# Patient Record
Sex: MALE | Race: WHITE | NOT HISPANIC OR LATINO | Employment: OTHER | URBAN - METROPOLITAN AREA
[De-identification: names, ages, dates, MRNs, and addresses within clinical notes are randomized per-mention and may not be internally consistent; named-entity substitution may affect disease eponyms.]

---

## 2022-03-31 ENCOUNTER — HOSPITAL ENCOUNTER (EMERGENCY)
Facility: HOSPITAL | Age: 77
Discharge: HOME | End: 2022-03-31
Attending: EMERGENCY MEDICINE
Payer: MEDICARE

## 2022-03-31 VITALS
WEIGHT: 267 LBS | BODY MASS INDEX: 36.16 KG/M2 | DIASTOLIC BLOOD PRESSURE: 91 MMHG | HEIGHT: 72 IN | SYSTOLIC BLOOD PRESSURE: 161 MMHG | HEART RATE: 98 BPM | RESPIRATION RATE: 16 BRPM | TEMPERATURE: 99 F | OXYGEN SATURATION: 99 %

## 2022-03-31 DIAGNOSIS — B02.9 HERPES ZOSTER WITHOUT COMPLICATION: Primary | ICD-10-CM

## 2022-03-31 PROCEDURE — 99284 EMERGENCY DEPT VISIT MOD MDM: CPT | Mod: ,,, | Performed by: EMERGENCY MEDICINE

## 2022-03-31 PROCEDURE — 99284 PR EMERGENCY DEPT VISIT,LEVEL IV: ICD-10-PCS | Mod: ,,, | Performed by: EMERGENCY MEDICINE

## 2022-03-31 PROCEDURE — 99283 EMERGENCY DEPT VISIT LOW MDM: CPT

## 2022-03-31 RX ORDER — VALACYCLOVIR HYDROCHLORIDE 1 G/1
1000 TABLET, FILM COATED ORAL 3 TIMES DAILY
Qty: 21 TABLET | Refills: 0 | Status: SHIPPED | OUTPATIENT
Start: 2022-03-31 | End: 2024-01-15

## 2022-03-31 NOTE — ED TRIAGE NOTES
Judson Torres, a 77 y.o. male presents to the ED w/ complaint of neck and back pain with rash on L side of neck. Pt reports that rash started a few days ago after grandchildren applied Tiger Balm to the area due to muscular pain. Pt denies exudate/bleeding/itching to area. Pt denies n/v/d, constipation, HA, chest pain, SOB, fatigue, fever/chills.     Triage note:  Chief Complaint   Patient presents with    Neck Pain     Put Aspercreme on neck and has rash to left side of neck      Review of patient's allergies indicates:  No Known Allergies  Past Medical History:   Diagnosis Date    Elevated PSA     Kidney stone     Urinary tract infection      LOC: The patient is awake, alert, and oriented to self, place, time, and situation. Pt is calm and cooperative. Affect is appropriate.  Speech is appropriate and clear.     APPEARANCE: Patient resting comfortably in no acute distress.  Patient is clean and well groomed.    SKIN: Pt has erythematous rash present to L side of neck radiating to L shoulder.  Patient has normal skin turgor and moist mucus membranes.  Skin intact; no breakdown or bruising noted.     MUSCULOSKELETAL: Patient moving upper and lower extremities without difficulty; denies pain in the extremities or back.  Denies weakness.     RESPIRATORY: Airway is open and patent. Respirations spontaneous, even, easy, and non-labored.  Patient has a normal effort and rate.  No accessory muscle use noted. Denies cough.     CARDIAC:  Normal rate noted.  No peripheral edema noted. No complaints of chest pain.      ABDOMEN: Soft and non tender to palpation.  No distention noted. Pt denies abdominal pain; denies nausea, vomiting, diarrhea, or constipation.    NEUROLOGIC: Eyes open spontaneously.  Behavior appropriate to situation.  Follows commands; facial expression symmetrical.  Purposeful motor response noted; normal sensation in all extremities. Pt denies headache; denies lightheadedness or dizziness; denies  visual disturbances; denies loss of balance; denies unilateral weakness.         Body Location Override (Optional): L forehead Wound Crusting?: clean Wound Evaluated By (Optional): Dr Cedric Jennings Wound Color?: pink Wound Diameter In Cm(Optional): 0 Detail Level: Detailed Add 96205 Cpt? (Important Note: In 2017 The Use Of 00147 Is Being Tracked By Cms To Determine Future Global Period Reimbursement For Global Periods): yes

## 2022-03-31 NOTE — ED NOTES
Discharge home , states understanding to follow up as directed. Ambulates out of ED without difficulty.All information per physician staff

## 2022-03-31 NOTE — ED PROVIDER NOTES
Encounter Date: 3/31/2022    SCRIBE #1 NOTE: I, Nellienenita felix, am scribing for, and in the presence of,  Mynor Alexandra MD. I have scribed the entire note.       History     Chief Complaint   Patient presents with    Neck Pain     Put Aspercreme on neck and has rash to left side of neck      Judson Torres is a 77 y.o. male who presents with a chief complaint of left sided neck pain, and rash on his left sided neck onset 2 days ago. He reports a pruritic and painful rash presented on the following day his grandchildren applied Aspercreme to the area of his left neck for pain. He denies any bleeding to the area. No falls/injury/trauma. He states that he has not seen dermatology yet. Patient reports no other identifying, alleviating, or exacerbating factors and denies fever, chills, visual changes, headache, ear pain,or any other associated symptoms at this time.             The history is provided by medical records and the patient. No  was used.     Review of patient's allergies indicates:  No Known Allergies  Past Medical History:   Diagnosis Date    Elevated PSA     Kidney stone     Urinary tract infection      Past Surgical History:   Procedure Laterality Date    HERNIA REPAIR       Family History   Problem Relation Age of Onset    Prostate cancer Neg Hx     Kidney cancer Neg Hx      Social History     Tobacco Use    Smoking status: Former Smoker    Smokeless tobacco: Never Used   Substance Use Topics    Alcohol use: No    Drug use: No     Review of Systems   Constitutional: Negative for chills and fever.   HENT: Negative for congestion, postnasal drip, rhinorrhea and sore throat.    Eyes: Negative for discharge.   Respiratory: Negative for cough, chest tightness and shortness of breath.    Cardiovascular: Negative for chest pain and leg swelling.   Gastrointestinal: Negative for abdominal pain, constipation, diarrhea, nausea and vomiting.   Genitourinary: Negative for difficulty  urinating, dysuria, flank pain, frequency, hematuria and urgency.   Musculoskeletal: Positive for neck pain. Negative for arthralgias, back pain, joint swelling and neck stiffness.   Skin: Positive for rash.   Allergic/Immunologic: Negative for immunocompromised state.   Neurological: Negative for syncope, weakness, light-headedness, numbness and headaches.   Psychiatric/Behavioral: Negative for confusion.       Physical Exam     Initial Vitals [03/31/22 1028]   BP Pulse Resp Temp SpO2   (!) 161/91 98 16 98.5 °F (36.9 °C) 99 %      MAP       --         Physical Exam    Nursing note and vitals reviewed.  Constitutional: He appears well-developed and well-nourished. He is not diaphoretic. No distress.   HENT:   Head: Normocephalic and atraumatic.   Eyes: EOM are normal. Pupils are equal, round, and reactive to light.   Neck: Neck supple. No tracheal deviation present. No JVD present.   Normal range of motion.  Cardiovascular: Normal rate, regular rhythm, normal heart sounds and intact distal pulses.   No murmur heard.  Pulmonary/Chest: Breath sounds normal. No stridor. No respiratory distress. He has no wheezes. He has no rhonchi. He has no rales.   Abdominal: Abdomen is soft. Bowel sounds are normal. He exhibits no distension. There is no abdominal tenderness.   Musculoskeletal:         General: No tenderness or edema. Normal range of motion.      Cervical back: Normal range of motion and neck supple.     Lymphadenopathy:     He has no cervical adenopathy.   Neurological: He is alert and oriented to person, place, and time. He has normal strength. No cranial nerve deficit.   Skin: Skin is warm and dry. No rash noted.   Vesicular rash to the left neck involving C3 and C4 dermatomes.   Psychiatric: He has a normal mood and affect. His behavior is normal. Thought content normal.         ED Course   Procedures  Labs Reviewed - No data to display       Imaging Results    None          Medications - No data to  display  Medical Decision Making:   History:   Old Medical Records: I decided to obtain old medical records.  Initial Assessment:   His history and physical exam are concerning for shingles. No evidence for occular involvement or [place] syndrome or disseminated shinges. Nontoxic and well appearing in nad. I wll prescribe TID valtrex for 7 days and recommend close PCP and dermatology follow up.   Strict er reutrn precautions discussed at length.  Differential Diagnosis:   Shingles, allergic reaction, dermatitis          Scribe Attestation:   Scribe #1: I performed the above scribed service and the documentation accurately describes the services I performed. I attest to the accuracy of the note.                 Clinical Impression:   Final diagnoses:  [B02.9] Herpes zoster without complication (Primary)          ED Disposition Condition    Discharge Stable        ED Prescriptions     Medication Sig Dispense Start Date End Date Auth. Provider    valACYclovir (VALTREX) 1000 MG tablet Take 1 tablet (1,000 mg total) by mouth 3 (three) times daily. for 7 days 21 tablet 3/31/2022 4/7/2022 Mynor Alexandra MD        Follow-up Information    None          Mynor Alexandra MD  04/03/22 5872

## 2022-08-17 ENCOUNTER — HOSPITAL ENCOUNTER (EMERGENCY)
Facility: HOSPITAL | Age: 77
Discharge: HOME | End: 2022-08-17
Attending: EMERGENCY MEDICINE
Payer: MEDICARE

## 2022-08-17 VITALS
BODY MASS INDEX: 30.8 KG/M2 | DIASTOLIC BLOOD PRESSURE: 76 MMHG | RESPIRATION RATE: 20 BRPM | SYSTOLIC BLOOD PRESSURE: 118 MMHG | OXYGEN SATURATION: 98 % | TEMPERATURE: 99 F | WEIGHT: 220 LBS | HEIGHT: 71 IN | HEART RATE: 78 BPM

## 2022-08-17 DIAGNOSIS — J06.9 VIRAL URI WITH COUGH: Primary | ICD-10-CM

## 2022-08-17 DIAGNOSIS — R06.02 SHORTNESS OF BREATH: ICD-10-CM

## 2022-08-17 LAB — SARS-COV-2 RDRP RESP QL NAA+PROBE: NEGATIVE

## 2022-08-17 PROCEDURE — 99284 EMERGENCY DEPT VISIT MOD MDM: CPT | Mod: 25

## 2022-08-17 PROCEDURE — 25000242 PHARM REV CODE 250 ALT 637 W/ HCPCS: Performed by: EMERGENCY MEDICINE

## 2022-08-17 PROCEDURE — 63600175 PHARM REV CODE 636 W HCPCS: Performed by: EMERGENCY MEDICINE

## 2022-08-17 PROCEDURE — U0002 COVID-19 LAB TEST NON-CDC: HCPCS | Performed by: EMERGENCY MEDICINE

## 2022-08-17 PROCEDURE — 25000003 PHARM REV CODE 250: Performed by: EMERGENCY MEDICINE

## 2022-08-17 RX ORDER — FLUTICASONE PROPIONATE 50 MCG
1 SPRAY, SUSPENSION (ML) NASAL 2 TIMES DAILY PRN
Qty: 15 G | Refills: 0 | Status: SHIPPED | OUTPATIENT
Start: 2022-08-17 | End: 2022-08-17 | Stop reason: SDUPTHER

## 2022-08-17 RX ORDER — BENZONATATE 100 MG/1
100 CAPSULE ORAL ONCE
Status: COMPLETED | OUTPATIENT
Start: 2022-08-17 | End: 2022-08-17

## 2022-08-17 RX ORDER — OXYMETAZOLINE HCL 0.05 %
1 SPRAY, NON-AEROSOL (ML) NASAL 2 TIMES DAILY
Qty: 15 ML | Refills: 0 | Status: SHIPPED | OUTPATIENT
Start: 2022-08-17 | End: 2022-08-20

## 2022-08-17 RX ORDER — FLUTICASONE PROPIONATE 50 MCG
2 SPRAY, SUSPENSION (ML) NASAL ONCE
Status: COMPLETED | OUTPATIENT
Start: 2022-08-17 | End: 2022-08-17

## 2022-08-17 RX ORDER — BENZONATATE 100 MG/1
100 CAPSULE ORAL 3 TIMES DAILY PRN
Qty: 20 CAPSULE | Refills: 0 | Status: SHIPPED | OUTPATIENT
Start: 2022-08-17 | End: 2022-08-27

## 2022-08-17 RX ORDER — DEXAMETHASONE 4 MG/1
8 TABLET ORAL
Status: COMPLETED | OUTPATIENT
Start: 2022-08-17 | End: 2022-08-17

## 2022-08-17 RX ORDER — FLUTICASONE PROPIONATE 50 MCG
1 SPRAY, SUSPENSION (ML) NASAL 2 TIMES DAILY PRN
Qty: 15 G | Refills: 0 | Status: SHIPPED | OUTPATIENT
Start: 2022-08-17 | End: 2024-01-15

## 2022-08-17 RX ORDER — BENZONATATE 100 MG/1
100 CAPSULE ORAL 3 TIMES DAILY PRN
Qty: 20 CAPSULE | Refills: 0 | Status: SHIPPED | OUTPATIENT
Start: 2022-08-17 | End: 2022-08-17 | Stop reason: SDUPTHER

## 2022-08-17 RX ORDER — OXYMETAZOLINE HCL 0.05 %
1 SPRAY, NON-AEROSOL (ML) NASAL 2 TIMES DAILY
Qty: 15 ML | Refills: 0 | Status: SHIPPED | OUTPATIENT
Start: 2022-08-17 | End: 2022-08-17 | Stop reason: SDUPTHER

## 2022-08-17 RX ADMIN — DEXAMETHASONE 8 MG: 4 TABLET ORAL at 03:08

## 2022-08-17 RX ADMIN — FLUTICASONE PROPIONATE 100 MCG: 50 SPRAY, METERED NASAL at 03:08

## 2022-08-17 RX ADMIN — BENZONATATE 100 MG: 100 CAPSULE ORAL at 03:08

## 2022-08-17 NOTE — ED PROVIDER NOTES
Encounter Date: 8/17/2022       History     Chief Complaint   Patient presents with    Shortness of Breath     Pt c/o SOB and congestion onset for a few days, states had a positive home test two days ago. States when he lays down he gets spasms in his throat.      77-year-old male presents the ER for evaluation throat discomfort shortness of breath.  Patient is complaining of upper respiratory nasal congestion.  He reports he had a positive COVID test approximately 2 days ago.  He reports earlier today he lied flat supine and he had what he described as spasming of his neck and he felt that it was closing.  Which prompted to come to the ER.  Arrived in the ER, no acute distress no active complaints at this time.        Review of patient's allergies indicates:  No Known Allergies  Past Medical History:   Diagnosis Date    Elevated PSA     Kidney stone     Urinary tract infection      Past Surgical History:   Procedure Laterality Date    HERNIA REPAIR       Family History   Problem Relation Age of Onset    Prostate cancer Neg Hx     Kidney cancer Neg Hx      Social History     Tobacco Use    Smoking status: Former Smoker    Smokeless tobacco: Never Used   Substance Use Topics    Alcohol use: No    Drug use: No     Review of Systems   Constitutional: Negative for fatigue and fever.   HENT: Positive for rhinorrhea and sinus pressure.    Respiratory: Positive for cough and shortness of breath.    All other systems reviewed and are negative.      Physical Exam     Initial Vitals [08/17/22 0038]   BP Pulse Resp Temp SpO2   130/81 80 20 99.3 °F (37.4 °C) 97 %      MAP       --         Physical Exam    Nursing note and vitals reviewed.  Constitutional: He appears well-developed and well-nourished.   HENT:   Head: Normocephalic and atraumatic.   Eyes: Pupils are equal, round, and reactive to light.   Neck:   Normal range of motion.  Cardiovascular: Normal rate, regular rhythm and normal heart sounds.    Pulmonary/Chest: Breath sounds normal. No respiratory distress.   Abdominal: Abdomen is soft. He exhibits no distension. There is no abdominal tenderness.   Musculoskeletal:         General: Normal range of motion.      Cervical back: Normal range of motion.     Neurological: He is alert and oriented to person, place, and time. He has normal strength. GCS score is 15. GCS eye subscore is 4. GCS verbal subscore is 5. GCS motor subscore is 6.   Skin: Skin is warm and dry. Capillary refill takes less than 2 seconds.   Psychiatric: He has a normal mood and affect. Thought content normal.         ED Course   Procedures  Labs Reviewed   SARS-COV-2 RNA AMPLIFICATION, QUAL          Imaging Results          X-Ray Chest PA And Lateral (Final result)  Result time 08/17/22 01:43:51    Final result by Wolf Clay MD (08/17/22 01:43:51)                 Impression:      No acute abnormality.      Electronically signed by: Wolf Clay  Date:    08/17/2022  Time:    01:43             Narrative:    EXAMINATION:  XR CHEST PA AND LATERAL    CLINICAL HISTORY:  Shortness of breath    TECHNIQUE:  PA and lateral views of the chest were performed.    COMPARISON:  None    FINDINGS:  The lungs are clear, with normal appearance of pulmonary vasculature and no pleural effusion or pneumothorax.    The cardiac silhouette is normal in size. The hilar and mediastinal contours are unremarkable.    Bones are intact. 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                                 Medications   dexAMETHasone tablet 8 mg (8 mg Oral Given 8/17/22 0308)   fluticasone propionate 50 mcg/actuation nasal spray 100 mcg (100 mcg Each Nostril Given 8/17/22 0310)   benzonatate capsule 100 mg (100 mg Oral Given 8/17/22 0309)     Medical Decision Making:   Initial Assessment:   Pleasant 77-year-old male presents the ER for evaluation of COVID shortness of breath and congestion.  He reports he  was lying flat and he had intense coughing/spasm of his neck which concerned him.  He thought his throat was going to close.  He came the ER no acute distress hemodynamically stable clear bilateral breath sounds, physical exam overall reassuring.  Sounds like he may have postnasal drip causing coughing.  No red flag symptoms.  Will check for COVID, chest x-ray reassess.             ED Course as of 08/17/22 0417   Wed Aug 17, 2022   0305 Resting in bed, no acute distress.  COVID negative x-ray no acute process.  Patient feeling well.  Hemodynamically stable.  Will plan discharge home.  Will give dose of Decadron for sinusitis will give Flonase and Tessalon Perles.  Return precautions discussed patient will be discharged. [SE]      ED Course User Index  [SE] Tami Barry MD             Clinical Impression:   Final diagnoses:  [R06.02] Shortness of breath  [R06.02] Shortness of breath - covid  [J06.9] Viral URI with cough (Primary)          ED Disposition Condition    Discharge Stable        ED Prescriptions     Medication Sig Dispense Start Date End Date Auth. Provider    benzonatate (TESSALON) 100 MG capsule  (Status: Discontinued) Take 1 capsule (100 mg total) by mouth 3 (three) times daily as needed for Cough. 20 capsule 8/17/2022 8/17/2022 Tami Barry MD    fluticasone propionate (FLONASE) 50 mcg/actuation nasal spray  (Status: Discontinued) 1 spray (50 mcg total) by Each Nostril route 2 (two) times daily as needed for Rhinitis or Allergies. 15 g 8/17/2022 8/17/2022 Tami Barry MD    oxymetazoline (AFRIN) 0.05 % nasal spray  (Status: Discontinued) 1 spray by Nasal route 2 (two) times daily. for 3 days 15 mL 8/17/2022 8/17/2022 Tami Barry MD    benzonatate (TESSALON) 100 MG capsule Take 1 capsule (100 mg total) by mouth 3 (three) times daily as needed for Cough. 20 capsule 8/17/2022 8/27/2022 Tami Barry MD    fluticasone propionate (FLONASE) 50 mcg/actuation nasal spray 1 spray  (50 mcg total) by Each Nostril route 2 (two) times daily as needed for Rhinitis or Allergies. 15 g 8/17/2022  Tami Barry MD    oxymetazoline (AFRIN) 0.05 % nasal spray 1 spray by Nasal route 2 (two) times daily. for 3 days 15 mL 8/17/2022 8/20/2022 Tami Barry MD        Follow-up Information    None          Tami Barry MD  08/17/22 041

## 2022-08-17 NOTE — ED NOTES
Pt c/o cough, congestion, and throat spasms x 3 days. Reports positive at-home COVID test 3 days ago. Denies pain, fever, nausea, vomiting, and diarrhea. Pt AAOx3. Respirations even and unlabored. Skin is warm and dry. NAD noted. CB within reach.    APPEARANCE: Alert, oriented and in no acute distress.  CARDIAC: Hypertensive. Normal rate. Pt denies chest pain.   PERIPHERAL VASCULAR: Normal cap refill. No edema. Warm to touch.    RESPIRATORY: Cough, congestion. Respirations are even and unlabored. Normal rate. Symmetrical chest expansion bilaterally. No obvious signs of distress.  GASTRO: Abdomen soft, non-tender, no distention.  MUSC: Spasms. Full ROM. No bony tenderness or soft tissue tenderness. No obvious deformity.  SKIN: Skin is warm and dry.  NEURO: [place] coma scale: eyes open spontaneously-4, oriented & converses-5, obeys commands-6. No neurological abnormalities.   MENTAL STATUS: Awake, alert and aware of environment.

## 2024-01-15 ENCOUNTER — HOSPITAL ENCOUNTER (EMERGENCY)
Facility: HOSPITAL | Age: 79
Discharge: HOME | End: 2024-01-16
Attending: EMERGENCY MEDICINE
Payer: MEDICARE

## 2024-01-15 ENCOUNTER — ANESTHESIA (OUTPATIENT)
Dept: ENDOSCOPY | Facility: HOSPITAL | Age: 79
End: 2024-01-15
Payer: MEDICARE

## 2024-01-15 ENCOUNTER — ANESTHESIA EVENT (OUTPATIENT)
Dept: ENDOSCOPY | Facility: HOSPITAL | Age: 79
End: 2024-01-15
Payer: MEDICARE

## 2024-01-15 VITALS
HEART RATE: 81 BPM | SYSTOLIC BLOOD PRESSURE: 138 MMHG | RESPIRATION RATE: 20 BRPM | WEIGHT: 220 LBS | BODY MASS INDEX: 30.8 KG/M2 | TEMPERATURE: 98 F | OXYGEN SATURATION: 95 % | DIASTOLIC BLOOD PRESSURE: 91 MMHG | HEIGHT: 71 IN
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
## 2024-01-15 DIAGNOSIS — T18.128A FOOD IMPACTION OF ESOPHAGUS, INITIAL ENCOUNTER: Primary | ICD-10-CM

## 2024-01-15 PROCEDURE — 25000003 PHARM REV CODE 250: Performed by: NURSE ANESTHETIST, CERTIFIED REGISTERED

## 2024-01-15 PROCEDURE — D9220A PRA ANESTHESIA: Mod: ANES,,, | Performed by: STUDENT IN AN ORGANIZED HEALTH CARE EDUCATION/TRAINING PROGRAM

## 2024-01-15 PROCEDURE — 37000008 HC ANESTHESIA 1ST 15 MINUTES: Performed by: INTERNAL MEDICINE

## 2024-01-15 PROCEDURE — 43247 EGD REMOVE FOREIGN BODY: CPT | Performed by: INTERNAL MEDICINE

## 2024-01-15 PROCEDURE — 96374 THER/PROPH/DIAG INJ IV PUSH: CPT | Mod: 59

## 2024-01-15 PROCEDURE — 37000009 HC ANESTHESIA EA ADD 15 MINS: Performed by: INTERNAL MEDICINE

## 2024-01-15 PROCEDURE — 63600175 PHARM REV CODE 636 W HCPCS: Mod: JZ,JG | Performed by: NURSE PRACTITIONER

## 2024-01-15 PROCEDURE — 99285 EMERGENCY DEPT VISIT HI MDM: CPT | Mod: 25

## 2024-01-15 PROCEDURE — 27201089 HC SNARE, DISP (ANY): Performed by: INTERNAL MEDICINE

## 2024-01-15 PROCEDURE — 63600175 PHARM REV CODE 636 W HCPCS: Performed by: NURSE ANESTHETIST, CERTIFIED REGISTERED

## 2024-01-15 PROCEDURE — D9220A PRA ANESTHESIA: Mod: CRNA,,, | Performed by: NURSE ANESTHETIST, CERTIFIED REGISTERED

## 2024-01-15 RX ORDER — PROPOFOL 10 MG/ML
VIAL (ML) INTRAVENOUS
Status: DISCONTINUED | OUTPATIENT
Start: 2024-01-15 | End: 2024-01-15

## 2024-01-15 RX ORDER — SODIUM CHLORIDE 0.9 % (FLUSH) 0.9 %
10 SYRINGE (ML) INJECTION DAILY PRN
Status: DISCONTINUED | OUTPATIENT
Start: 2024-01-15 | End: 2024-01-21 | Stop reason: HOSPADM

## 2024-01-15 RX ORDER — SODIUM CHLORIDE 9 MG/ML
INJECTION, SOLUTION INTRAVENOUS CONTINUOUS
Status: DISCONTINUED | OUTPATIENT
Start: 2024-01-15 | End: 2024-01-21 | Stop reason: HOSPADM

## 2024-01-15 RX ORDER — ONDANSETRON HYDROCHLORIDE 2 MG/ML
INJECTION, SOLUTION INTRAMUSCULAR; INTRAVENOUS
Status: DISCONTINUED | OUTPATIENT
Start: 2024-01-15 | End: 2024-01-15

## 2024-01-15 RX ORDER — TAMSULOSIN HYDROCHLORIDE 0.4 MG/1
0.4 CAPSULE ORAL 2 TIMES DAILY
COMMUNITY
Start: 2023-11-06

## 2024-01-15 RX ORDER — LIDOCAINE HYDROCHLORIDE 20 MG/ML
INJECTION INTRAVENOUS
Status: DISCONTINUED | OUTPATIENT
Start: 2024-01-15 | End: 2024-01-15

## 2024-01-15 RX ORDER — TADALAFIL 5 MG/1
5 TABLET ORAL DAILY PRN
COMMUNITY

## 2024-01-15 RX ORDER — PHENYLEPHRINE HYDROCHLORIDE 10 MG/ML
INJECTION INTRAVENOUS
Status: DISCONTINUED | OUTPATIENT
Start: 2024-01-15 | End: 2024-01-15

## 2024-01-15 RX ORDER — EPHEDRINE SULFATE 50 MG/ML
INJECTION, SOLUTION INTRAVENOUS
Status: DISCONTINUED | OUTPATIENT
Start: 2024-01-15 | End: 2024-01-15

## 2024-01-15 RX ORDER — GLUCAGON 1 MG
1 KIT INJECTION
Status: COMPLETED | OUTPATIENT
Start: 2024-01-15 | End: 2024-01-15

## 2024-01-15 RX ORDER — PANTOPRAZOLE SODIUM 40 MG/1
40 TABLET, DELAYED RELEASE ORAL DAILY
Qty: 90 TABLET | Refills: 3 | Status: SHIPPED | OUTPATIENT
Start: 2024-01-15 | End: 2025-01-14

## 2024-01-15 RX ORDER — SUCCINYLCHOLINE CHLORIDE 20 MG/ML
INJECTION INTRAMUSCULAR; INTRAVENOUS
Status: DISCONTINUED | OUTPATIENT
Start: 2024-01-15 | End: 2024-01-15

## 2024-01-15 RX ADMIN — SODIUM CHLORIDE: 0.9 INJECTION, SOLUTION INTRAVENOUS at 04:01

## 2024-01-15 RX ADMIN — LIDOCAINE HYDROCHLORIDE 100 MG: 20 INJECTION, SOLUTION INTRAVENOUS at 04:01

## 2024-01-15 RX ADMIN — Medication 150 MG: at 04:01

## 2024-01-15 RX ADMIN — EPHEDRINE SULFATE 15 MG: 50 INJECTION INTRAVENOUS at 04:01

## 2024-01-15 RX ADMIN — ONDANSETRON 4 MG: 2 INJECTION INTRAMUSCULAR; INTRAVENOUS at 04:01

## 2024-01-15 RX ADMIN — GLUCAGON 1 MG: 1 INJECTION, POWDER, LYOPHILIZED, FOR SOLUTION INTRAMUSCULAR; INTRAVENOUS at 01:01

## 2024-01-15 RX ADMIN — PHENYLEPHRINE HYDROCHLORIDE 150 MCG: 10 INJECTION INTRAVENOUS at 04:01

## 2024-01-15 RX ADMIN — SUCCINYLCHOLINE CHLORIDE 160 MG: 20 INJECTION, SOLUTION INTRAMUSCULAR; INTRAVENOUS at 04:01

## 2024-01-15 RX ADMIN — Medication 30 MG: at 04:01

## 2024-01-15 NOTE — CONSULTS
"[facility]    CC: Food bolus    HPI 78 y.o. male presenting due to food bolus after swallowing a fatty piece of meat. He reports he is unable to handle liquid and most of his secretions. He reports prior food bolus 10+ years ago at which time he had an EGD for removal. He denies any esophageal stricture or acid reflux. Denies use of blood thinners.     Past Medical History  Past Medical History:   Diagnosis Date    Elevated PSA     Kidney stone     Urinary tract infection      Physical Examination  BP (!) 159/90 (BP Location: Left arm, Patient Position: Sitting)   Pulse 85   Temp 98.1 °F (36.7 °C) (Oral)   Resp 18   Ht 5' 11" (1.803 m)   Wt 99.8 kg (220 lb)   SpO2 98%   BMI 30.68 kg/m²   General appearance: alert, cooperative, no distress  Lungs: on room air without difficulty breathing   Heart: regular rate and rhythm   Abdomen: soft, non-tender; bowel sounds normoactive; no organomegaly    CXR: no free air under the diaphragm on my read    I personally reviewed external medical records.    Assessment:   Mr. Judson Torres is a 78 year old male with:  Food bolus requiring emergent endoscopy (acute illness requiring hospitalization)    Plan:  - Plan for emergent endoscopy for removal of food bolus (high risk endoscopy)  - Keep NPO at this time    Abida Lozano MD  [facility]    Roosevelt Ramirez MD   [facility]       "

## 2024-01-15 NOTE — TRANSFER OF CARE
"Anesthesia Transfer of Care Note    Patient: Judson Torres    Procedure(s) Performed: Procedure(s) (LRB):  EGD (ESOPHAGOGASTRODUODENOSCOPY) (N/A)    Patient location: PACU    Anesthesia Type: general    Transport from OR: Transported from OR on room air with adequate spontaneous ventilation    Post pain: adequate analgesia    Post assessment: no apparent anesthetic complications and tolerated procedure well    Post vital signs: stable    Level of consciousness: awake and alert    Nausea/Vomiting: no nausea/vomiting    Complications: none    Transfer of care protocol was followed      Last vitals: Visit Vitals  /80 (BP Location: Left arm, Patient Position: Lying)   Pulse 77   Temp 36.6 °C (97.9 °F) (Temporal)   Resp 20   Ht 5' 11" (1.803 m)   Wt 99.8 kg (220 lb)   SpO2 96%   BMI 30.68 kg/m²     "

## 2024-01-15 NOTE — ANESTHESIA POSTPROCEDURE EVALUATION
Anesthesia Post Evaluation    Patient: Judson Torres    Procedure(s) Performed: Procedure(s) (LRB):  EGD (ESOPHAGOGASTRODUODENOSCOPY) (N/A)    Final Anesthesia Type: general      Patient location during evaluation: GI PACU  Patient participation: Yes- Able to Participate  Level of consciousness: awake and alert, oriented and awake  Post-procedure vital signs: reviewed and stable  Pain management: adequate  Airway patency: patent  LAURIE mitigation strategies: Multimodal analgesia  PONV status at discharge: No PONV  Anesthetic complications: no      Cardiovascular status: blood pressure returned to baseline and hemodynamically stable  Respiratory status: unassisted and spontaneous ventilation  Hydration status: euvolemic  Follow-up not needed.              Vitals Value Taken Time   /80 01/15/24 1710   Temp 36.6 °C (97.9 °F) 01/15/24 1710   Pulse 77 01/15/24 1710   Resp 20 01/15/24 1710   SpO2 96 % 01/15/24 1710         No case tracking events are documented in the log.      Pain/Soraida Score: No data recorded

## 2024-01-15 NOTE — PHARMACY MED REC
"    [facility]           Pharmacy  Admission Medication History     The home medication history was taken by Norma Magallon.      Medication history obtained from Medications listed below were obtained from: Patient/family    Based on information gathered for medication list, you may go to "Admission" then "Reconcile Home Medications" tabs to review and/or act upon those items.     The home medication list has been updated by the Pharmacy department.   Please read ALL comments highlighted in yellow.   Please address this information as you see fit.    Feel free to contact us if you have any questions or require assistance.    The medications listed below were removed from the home medication list.  Please reorder if appropriate:    Patient reports NOT TAKING the following medication(s):  Cipro 100mg  Flonase nasal  Valtrex 1000mg      No current facility-administered medications on file prior to encounter.     Current Outpatient Medications on File Prior to Encounter   Medication Sig Dispense Refill    tadalafiL (CIALIS) 5 MG tablet Take 5 mg by mouth daily as needed for Erectile Dysfunction.      tamsulosin (FLOMAX) 0.4 mg Cap Take 0.4 mg by mouth 2 (two) times daily.      UNABLE TO FIND Prostate oral - over the counter medication         Please address this information as you see fit.  Feel free to contact us if you have any questions or require assistance.    Norma Magallon  [phone]              .          "

## 2024-01-15 NOTE — ANESTHESIA PREPROCEDURE EVALUATION
"                                                                                                             01/15/2024  [facility]  Anesthesia Pre-Operative Evaluation     Patient Name: Judson Torres  YOB: 1945  MRN: [id number]    SUBJECTIVE:     Pre-operative evaluation for Procedure(s) (LRB):  EGD (ESOPHAGOGASTRODUODENOSCOPY) (N/A)     01/15/2024    Judson Torres is a 78 y.o. male   Patient now presents for the above procedure(s).    Patient Active Problem List   Diagnosis    Elevated PSA, less than 10 ng/ml       Review of patient's allergies indicates:  No Known Allergies    Current Inpatient Medications:      No current facility-administered medications on file prior to encounter.     Current Outpatient Medications on File Prior to Encounter   Medication Sig Dispense Refill    tadalafiL (CIALIS) 5 MG tablet Take 5 mg by mouth daily as needed for Erectile Dysfunction.      tamsulosin (FLOMAX) 0.4 mg Cap Take 0.4 mg by mouth 2 (two) times daily.      UNABLE TO FIND Prostate oral - over the counter medication         Past Surgical History:   Procedure Laterality Date    HERNIA REPAIR         OBJECTIVE:     Vital Signs Range (Last 24H):  Temp:  [36.7 °C (98.1 °F)]   Pulse:  [85]   Resp:  [18]   BP: (159)/(90)   SpO2:  [98 %]       Significant Labs:  No results found for: "WBC", "HGB", "HCT", "PLT", "CHOL", "TRIG", "HDL", "LDLDIRECT", "ALT", "AST", "NA", "K", "CL", "CREATININE", "BUN", "CO2", "TSH", "PSA", "INR", "GLUF", "HGBA1C", "MICROALBUR"    Diagnostic Studies: No relevant studies.    EKG:   No results found for this or any previous visit.    ASSESSMENT/PLAN:           Pre-op Assessment    I have reviewed the Patient Summary Reports.     I have reviewed the Nursing Notes. I have reviewed the NPO Status.   I have reviewed the Medications.     Review of Systems  Anesthesia Hx:  No previous Anesthesia             Denies Family Hx of Anesthesia complications.    Denies Personal Hx " of Anesthesia complications.                    Social:  Non-Smoker, No Alcohol Use       Hematology/Oncology:  Hematology Normal   Oncology Normal                                   EENT/Dental:  EENT/Dental Normal           Cardiovascular:  Cardiovascular Normal Exercise tolerance: good                                           Pulmonary:  Pulmonary Normal                       Renal/:   renal calculi BPH              Hepatic/GI:    Hiatal Hernia,    Hx of previous food bolus.           Neurological:  Neurology Normal                                      Endocrine:  Endocrine Normal            Psych:  Psychiatric Normal                    Physical Exam  General: Cooperative, Alert and Oriented    Airway:  Mallampati: I   Mouth Opening: Normal  TM Distance: Normal  Tongue: Normal  Neck ROM: Normal ROM        Anesthesia Plan  Type of Anesthesia, risks & benefits discussed:    Anesthesia Type: Gen ETT  Intra-op Monitoring Plan: Standard ASA Monitors  Post Op Pain Control Plan: multimodal analgesia  Induction:  rapid sequence  Airway Plan: Video and Direct, Post-Induction  Informed Consent: Informed consent signed with the Patient and all parties understand the risks and agree with anesthesia plan.  All questions answered.   ASA Score: 2 Emergent  Day of Surgery Review of History & Physical: H&P Update referred to the surgeon/provider.    Ready For Surgery From Anesthesia Perspective.     .

## 2024-01-15 NOTE — ANESTHESIA PROCEDURE NOTES
Intubation    Date/Time: 1/15/2024 4:19 PM    Performed by: Lata Bush CRNA  Authorized by: Anthony Ames MD    Intubation:     Induction:  Rapid sequence induction    Intubated:  Postinduction    Mask Ventilation:  Not attempted    Attempts:  1    Attempted By:  CRNA    Method of Intubation:  Video laryngoscopy    Blade:  Hartley 3    Laryngeal View Grade: Grade I - full view of cords      Difficult Airway Encountered?: No      Complications:  None    Airway Device:  Oral endotracheal tube    Airway Device Size:  7.5    Style/Cuff Inflation:  Cuffed (inflated to minimal occlusive pressure)    Tube secured:  23    Secured at:  The lips    Placement Verified By:  Capnometry    Complicating Factors:  None    Findings Post-Intubation:  BS equal bilateral and atraumatic/condition of teeth unchanged  Notes:      Soft tissue and dentition unchanged.

## 2024-01-15 NOTE — ED NOTES
Patient transported up to [facility] in a gown, with two bags of belongings one bag contained his shirt glassess and socks, the other bag had his shoes and face sheet with ticket from security. Belongings were locked up with security contained cell phone wallet and keys. Patient wheeled off unit via wheel chair

## 2024-01-15 NOTE — PLAN OF CARE
EGD complete with removal of impacted food bolus. Discharge to home in a taxi following recovery considering lack of transportation home without driving himself.    See EGD report for discharge information   Prescription for daily PPI therapy submitted to pharmacy   I will plan follow up in my clinic to discuss need for additional evaluation     Roosevelt Ramirez MD

## 2024-01-15 NOTE — ED PROVIDER NOTES
Encounter Date: 1/15/2024       History     Chief Complaint   Patient presents with    food bolus     Pt reports he ate some meat for lunch and a piece is stuck. Pt spitting his saliva in a cup. Pt able to speak in complete sentences. Pt reports he has had a food bolus before.      78-year-old male presents emergency room with reports of a food bolus.  Patient states he feels that a piece of fatty meat stuck in his throat.  He reports that he has had this occur before in the past in which he required endoscopy.  He has a past medical history of elevated PSA, kidney stones, UTI and food bolus.  Patient is unable to tolerate his own saliva at this time.  See physical examination.    The history is provided by the patient. No  was used.     Review of patient's allergies indicates:  No Known Allergies  Past Medical History:   Diagnosis Date    Elevated PSA     Kidney stone     Urinary tract infection      Past Surgical History:   Procedure Laterality Date    HERNIA REPAIR       Family History   Problem Relation Age of Onset    Prostate cancer Neg Hx     Kidney cancer Neg Hx      Social History     Tobacco Use    Smoking status: Former    Smokeless tobacco: Never   Substance Use Topics    Alcohol use: No    Drug use: No     Review of Systems   HENT:  Positive for trouble swallowing.    All other systems reviewed and are negative.      Physical Exam     Initial Vitals [01/15/24 1333]   BP Pulse Resp Temp SpO2   (!) 159/90 85 18 98.1 °F (36.7 °C) 98 %      MAP       --         Physical Exam    Constitutional: He appears well-developed and well-nourished. He is not diaphoretic. No distress.   Near patient actively spitting in a Bui's cup.   HENT:   Head: Normocephalic and atraumatic.   Right Ear: Hearing normal.   Left Ear: Hearing normal.   Nose: Nose normal.   Mouth/Throat: Uvula is midline, oropharynx is clear and moist and mucous membranes are normal.   Eyes: Lids are normal. Pupils are equal,  round, and reactive to light.   Cardiovascular:  Normal rate.           Pulmonary/Chest: Effort normal and breath sounds normal. No respiratory distress. He has no wheezes. He has no rhonchi.   Abdominal: Abdomen is soft. There is no abdominal tenderness.   Musculoskeletal:         General: Normal range of motion.      Cervical back: No rigidity.     Neurological: He is oriented to person, place, and time.   Skin: Skin is warm and dry. No rash noted.   Psychiatric: He has a normal mood and affect. His behavior is normal. Judgment and thought content normal.         ED Course   Procedures  Labs Reviewed - No data to display       Imaging Results    None          Medications   glucagon (human recombinant) injection 1 mg (1 mg Intravenous Given 1/15/24 1355)     Medical Decision Making  Differential Diagnosis includes, but is not limited to:  Bowel obstruction, incarcerated/strangulated hernia, ileus, appendicitis, cholecystitis, aspirated foreign body, esophageal food impaction, biliary colic, colitis/diverticulitis, gastroenteritis, esophagitis, hepatitis, pancreatitis, GERD, PUD, constipation, nephrolithiasis, UTI/pyelonephritis.       Risk  Prescription drug management.               ED Course as of 01/15/24 1602   Mon Mookie 15, 2024   1409 Spoke with Dr Lozano who states she will add the pt to the schedule now.   No additional orders given [DT]   1410 No success with Glucagon nor coke.  [DT]      ED Course User Index  [DT] Cassidy Singh NP                           Clinical Impression:  Final diagnoses:  [T18.128A, W44.F3XA] Food impaction of esophagus, initial encounter (Primary)          ED Disposition Condition    Observation Stable                Cassidy Singh NP  01/15/24 1602

## 2024-01-15 NOTE — PROVATION PATIENT INSTRUCTIONS
Discharge Summary/Instructions after an Endoscopic Procedure  Patient Name: Judson Torres  Patient MRN: [id number]  Patient YOB: 1945  Monday, January 15, 2024  Roosevelt Ramirez MD  Dear patient,  As a result of recent federal legislation (The Federal Cures Act), you may   receive lab or pathology results from your procedure in your [facility]   account before your physician is able to contact you. Your physician or   their representative will relay the results to you with their   recommendations at their soonest availability.  Thank you,  Your health is very important to us during the Covid Crisis. Following your   procedure today, you will receive a daily text for 2 weeks asking about   signs or symptoms of Covid 19.  Please respond to this text when you   receive it so we can follow up and keep you as safe as possible.   RESTRICTIONS:  During your procedure today, you received medications for sedation.  These   medications may affect your judgment, balance and coordination.  Therefore,   for 24 hours, you have the following restrictions:   - DO NOT drive a car, operate machinery, make legal/financial decisions,   sign important papers or drink alcohol.    ACTIVITY:  Today: no heavy lifting, straining or running due to procedural   sedation/anesthesia.  The following day: return to full activity including work.  DIET:  Eat and drink normally unless instructed otherwise.     TREATMENT FOR COMMON SIDE EFFECTS:  - Mild abdominal pain, nausea, belching, bloating or excessive gas:  rest,   eat lightly and use a heating pad.  - Sore Throat: treat with throat lozenges and/or gargle with warm salt   water.  - Because air was used during the procedure, expelling large amounts of air   from your rectum or belching is normal.  - If a bowel prep was taken, you may not have a bowel movement for 1-3 days.    This is normal.  SYMPTOMS TO WATCH FOR AND REPORT TO YOUR PHYSICIAN:  1. Abdominal pain or bloating, other than  gas cramps.  2. Chest pain.  3. Back pain.  4. Signs of infection such as: chills or fever occurring within 24 hours   after the procedure.  5. Rectal bleeding, which would show as bright red, maroon, or black stools.   (A tablespoon of blood from the rectum is not serious, especially if   hemorrhoids are present.)  6. Vomiting.  7. Weakness or dizziness.  GO DIRECTLY TO THE NEAREST EMERGENCY ROOM IF YOU HAVE ANY OF THE FOLLOWING:      Difficulty breathing              Chills and/or fever over 101 F   Persistent vomiting and/or vomiting blood   Severe abdominal pain   Severe chest pain   Black, tarry stools   Bleeding- more than one tablespoon   Any other symptom or condition that you feel may need urgent attention  Your doctor recommends these additional instructions:  If any biopsies were taken, your doctors clinic will contact you in 1 to 2   weeks with any results.  - Initiate daily PPI therapy now   - Return to GI clinic at appointment to be scheduled for further evaluation   of dysphagia which is likely related to reflux esophagitis vs lower   esophageal ring   - Discharge to home  - Resume previous diet and medications  - Condition stable   - The signs and symptoms of potential delayed complications were discussed   with the patient. If signs or symptoms of these complications develop, call   the [facility] at [phone].   - Return to normal activities tomorrow.  Written discharge instructions were   provided to the patient.  For questions, problems or results please call your physician - Roosevelt Ramirez MD.  EMERGENCY PHONE NUMBER: [phone],  LAB RESULTS: [phone]  IF A COMPLICATION OR EMERGENCY SITUATION ARISES AND YOU ARE UNABLE TO REACH   YOUR PHYSICIAN - GO DIRECTLY TO THE EMERGENCY ROOM.  MD Roosevelt Alvarado MD  1/15/2024 5:16:21 PM  This report has been verified and signed electronically.  Dear patient,  As a result of recent federal legislation (The  Federal Cures Act), you may   receive lab or pathology results from your procedure in your [facility]   account before your physician is able to contact you. Your physician or   their representative will relay the results to you with their   recommendations at their soonest availability.  Thank you,  [employer]OVATION

## 2024-01-16 NOTE — PLAN OF CARE
Patient has met PACU discharge criteria, VSS on room air, patient without complaint of pain. Released from PACU by Anesthesia MD.

## 2024-01-19 ENCOUNTER — TELEPHONE (OUTPATIENT)
Dept: GASTROENTEROLOGY | Facility: CLINIC | Age: 79
End: 2024-01-19
Payer: MEDICARE

## 2024-01-19 NOTE — TELEPHONE ENCOUNTER
----- Message from Roosevelt Ramirez MD sent at 1/15/2024  4:57 PM CST -----  Please schedule follow up clinic visit in 2 weeks to follow up treatment in the ER for food bolus impaction

## 2024-01-19 NOTE — TELEPHONE ENCOUNTER
"Attempt to schedule follow up appt from ED visit for food bolus impaction.  Pt states "I'm feeling fine, no need to schedule an appt". Clinic telephone number given.  "

## 2024-01-21 ENCOUNTER — HOSPITAL ENCOUNTER (EMERGENCY)
Facility: HOSPITAL | Age: 79
Discharge: HOME | End: 2024-01-21
Attending: EMERGENCY MEDICINE
Payer: MEDICARE

## 2024-01-21 VITALS
SYSTOLIC BLOOD PRESSURE: 137 MMHG | OXYGEN SATURATION: 99 % | TEMPERATURE: 98 F | WEIGHT: 220 LBS | RESPIRATION RATE: 16 BRPM | BODY MASS INDEX: 30.68 KG/M2 | DIASTOLIC BLOOD PRESSURE: 80 MMHG | HEART RATE: 84 BPM
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
## 2024-01-21 DIAGNOSIS — T18.128D FOOD IMPACTION OF ESOPHAGUS, SUBSEQUENT ENCOUNTER: Primary | ICD-10-CM

## 2024-01-21 LAB
ALBUMIN SERPL BCP-MCNC: 4.4 G/DL (ref 3.5–5.2)
ALP SERPL-CCNC: 42 U/L (ref 55–135)
ALT SERPL W/O P-5'-P-CCNC: 13 U/L (ref 10–44)
ANION GAP SERPL CALC-SCNC: 7 MMOL/L (ref 8–16)
AST SERPL-CCNC: 15 U/L (ref 10–40)
BASOPHILS # BLD AUTO: 0.06 K/UL (ref 0–0.2)
BASOPHILS NFR BLD: 1.3 % (ref 0–1.9)
BILIRUB SERPL-MCNC: 0.4 MG/DL (ref 0.1–1)
BUN SERPL-MCNC: 13 MG/DL (ref 8–23)
CALCIUM SERPL-MCNC: 9.2 MG/DL (ref 8.7–10.5)
CHLORIDE SERPL-SCNC: 105 MMOL/L (ref 95–110)
CO2 SERPL-SCNC: 27 MMOL/L (ref 23–29)
CREAT SERPL-MCNC: 1.1 MG/DL (ref 0.5–1.4)
DIFFERENTIAL METHOD BLD: ABNORMAL
EOSINOPHIL # BLD AUTO: 0.1 K/UL (ref 0–0.5)
EOSINOPHIL NFR BLD: 1.7 % (ref 0–8)
ERYTHROCYTE [DISTWIDTH] IN BLOOD BY AUTOMATED COUNT: 13.3 % (ref 11.5–14.5)
EST. GFR  (NO RACE VARIABLE): >60 ML/MIN/1.73 M^2
GLUCOSE SERPL-MCNC: 144 MG/DL (ref 70–110)
HCT VFR BLD AUTO: 40.5 % (ref 40–54)
HGB BLD-MCNC: 13.9 G/DL (ref 14–18)
IMM GRANULOCYTES # BLD AUTO: 0.01 K/UL (ref 0–0.04)
IMM GRANULOCYTES NFR BLD AUTO: 0.2 % (ref 0–0.5)
LYMPHOCYTES # BLD AUTO: 1.7 K/UL (ref 1–4.8)
LYMPHOCYTES NFR BLD: 36.7 % (ref 18–48)
MCH RBC QN AUTO: 29.9 PG (ref 27–31)
MCHC RBC AUTO-ENTMCNC: 34.3 G/DL (ref 32–36)
MCV RBC AUTO: 87 FL (ref 82–98)
MONOCYTES # BLD AUTO: 0.4 K/UL (ref 0.3–1)
MONOCYTES NFR BLD: 8.6 % (ref 4–15)
NEUTROPHILS # BLD AUTO: 2.4 K/UL (ref 1.8–7.7)
NEUTROPHILS NFR BLD: 51.5 % (ref 38–73)
NRBC BLD-RTO: 0 /100 WBC
PLATELET # BLD AUTO: 196 K/UL (ref 150–450)
PMV BLD AUTO: 10.3 FL (ref 9.2–12.9)
POTASSIUM SERPL-SCNC: 3.9 MMOL/L (ref 3.5–5.1)
PROT SERPL-MCNC: 7.5 G/DL (ref 6–8.4)
RBC # BLD AUTO: 4.65 M/UL (ref 4.6–6.2)
SODIUM SERPL-SCNC: 139 MMOL/L (ref 136–145)
WBC # BLD AUTO: 4.66 K/UL (ref 3.9–12.7)

## 2024-01-21 PROCEDURE — 96372 THER/PROPH/DIAG INJ SC/IM: CPT

## 2024-01-21 PROCEDURE — 63600175 PHARM REV CODE 636 W HCPCS: Mod: JZ,JG

## 2024-01-21 PROCEDURE — 96374 THER/PROPH/DIAG INJ IV PUSH: CPT

## 2024-01-21 PROCEDURE — 63600175 PHARM REV CODE 636 W HCPCS: Mod: JZ,JG | Performed by: EMERGENCY MEDICINE

## 2024-01-21 PROCEDURE — 80053 COMPREHEN METABOLIC PANEL: CPT | Performed by: EMERGENCY MEDICINE

## 2024-01-21 PROCEDURE — 99284 EMERGENCY DEPT VISIT MOD MDM: CPT | Mod: 25

## 2024-01-21 PROCEDURE — 85025 COMPLETE CBC W/AUTO DIFF WBC: CPT | Performed by: EMERGENCY MEDICINE

## 2024-01-21 RX ORDER — GLUCAGON 1 MG
1 KIT INJECTION
Status: COMPLETED | OUTPATIENT
Start: 2024-01-21 | End: 2024-01-21

## 2024-01-21 RX ADMIN — GLUCAGON 1 MG: 1 INJECTION, POWDER, LYOPHILIZED, FOR SOLUTION INTRAMUSCULAR; INTRAVENOUS at 09:01

## 2024-01-21 RX ADMIN — GLUCAGON 1 MG: 1 INJECTION, POWDER, LYOPHILIZED, FOR SOLUTION INTRAMUSCULAR; INTRAVENOUS at 10:01

## 2024-01-21 NOTE — ED TRIAGE NOTES
C/o food bolus after eating broccoli, cauliflower and sausage this am. Hx of food bolus on 1/15/24. Speech clear. Unable to swallow secretions, repeatedly coughing up phlegm into cup. Denies abd pain or discomfort. Resp even unlabored. Skin warm and dry.

## 2024-01-21 NOTE — ED PROVIDER NOTES
Encounter Date: 1/21/2024       History     Chief Complaint   Patient presents with    food bolus     Pt was recently seen for same, pt complains of eating sausage this am  and feels like it's stuck, unable to tolerate secretions      Patient is a 78-year-old male who says he was eating sausage this morning and feels as though it is stuck in his esophagus.  He is unable to tolerate secretions.  Patient was seen here a few days ago for the same and underwent endoscopy.  He denies any breathing difficulty.      Review of patient's allergies indicates:  No Known Allergies  Past Medical History:   Diagnosis Date    Elevated PSA     Kidney stone     Urinary tract infection      Past Surgical History:   Procedure Laterality Date    ESOPHAGOGASTRODUODENOSCOPY N/A 1/15/2024    Procedure: EGD (ESOPHAGOGASTRODUODENOSCOPY);  Surgeon: Roosevelt Ramirez MD;  Location: [facility];  Service: Endoscopy;  Laterality: N/A;    HERNIA REPAIR       Family History   Problem Relation Age of Onset    Prostate cancer Neg Hx     Kidney cancer Neg Hx      Social History     Tobacco Use    Smoking status: Former    Smokeless tobacco: Never   Substance Use Topics    Alcohol use: No    Drug use: No     Review of Systems   HENT:  Positive for trouble swallowing.    Respiratory:  Negative for shortness of breath.    Gastrointestinal:  Negative for vomiting.       Physical Exam     Initial Vitals [01/21/24 0934]   BP Pulse Resp Temp SpO2   (!) 159/87 80 20 98.4 °F (36.9 °C) 98 %      MAP       --         Physical Exam    Nursing note and vitals reviewed.  Constitutional: No distress.   HENT:   Head: Atraumatic.   Neck: Neck supple.   Cardiovascular:  Normal rate, regular rhythm and normal heart sounds.           Pulmonary/Chest: Breath sounds normal.   Abdominal: Abdomen is soft. He exhibits no distension. There is no abdominal tenderness.   Musculoskeletal:         General: Normal range of motion.      Cervical back: Neck supple.     Neurological:  He is alert and oriented to person, place, and time.   Skin: Skin is warm and dry.   Psychiatric: Thought content normal.         ED Course   Procedures  Labs Reviewed   CBC W/ AUTO DIFFERENTIAL   COMPREHENSIVE METABOLIC PANEL          Imaging Results    None          Medications   glucagon (human recombinant) injection 1 mg (1 mg Intramuscular Given 1/21/24 0988)   glucagon (human recombinant) injection 1 mg (1 mg Intravenous Given 1/21/24 1015)     Medical Decision Making  Emergent evaluation of a 78-year-old male who believes he has an esophageal food bolus after eating sausage this morning.  Patient has a history of this and was seen here last week for the same.  He was given glucagon here in the emergency department with resolution.  Patient was then able to handle his secretions as well as drinking water with no difficulty.  He should return to the emergency department for any recurrence or any other urgent concerns.    Amount and/or Complexity of Data Reviewed  Labs: ordered.    Risk  Prescription drug management.                                      Clinical Impression:  Final diagnoses:  [T18.128D, W44.F3XD] Food impaction of esophagus, subsequent encounter - resolved (Primary)          ED Disposition Condition    Discharge Stable          ED Prescriptions    None       Follow-up Information       Follow up With Specialties Details Why Contact Info    Lanie Gray MD Family Medicine  As needed [address]  [place] [facility] [address] [phone]      [place] - Emergency Dept Emergency Medicine  If symptoms reoccur [address] [facility] [address] [phone]             Minh Ford MD  01/21/24 2746

## 2024-11-09 ENCOUNTER — OFFICE VISIT (OUTPATIENT)
Dept: URGENT CARE | Facility: CLINIC | Age: 79
End: 2024-11-09
Payer: MEDICARE

## 2024-11-09 VITALS
OXYGEN SATURATION: 98 % | SYSTOLIC BLOOD PRESSURE: 161 MMHG | RESPIRATION RATE: 18 BRPM | DIASTOLIC BLOOD PRESSURE: 99 MMHG | WEIGHT: 218.25 LBS | HEIGHT: 71 IN | HEART RATE: 68 BPM | TEMPERATURE: 98 F | BODY MASS INDEX: 30.56 KG/M2

## 2024-11-09 DIAGNOSIS — R30.0 DYSURIA: ICD-10-CM

## 2024-11-09 DIAGNOSIS — N30.01 ACUTE CYSTITIS WITH HEMATURIA: Primary | ICD-10-CM

## 2024-11-09 DIAGNOSIS — R03.0 ELEVATED BLOOD PRESSURE READING: ICD-10-CM

## 2024-11-09 LAB
BILIRUBIN, UA POC OHS: NEGATIVE
BLOOD, UA POC OHS: ABNORMAL
CLARITY, UA POC OHS: ABNORMAL
COLOR, UA POC OHS: ABNORMAL
GLUCOSE, UA POC OHS: NEGATIVE
KETONES, UA POC OHS: NEGATIVE
LEUKOCYTES, UA POC OHS: ABNORMAL
NITRITE, UA POC OHS: NEGATIVE
PH, UA POC OHS: 6
PROTEIN, UA POC OHS: 100
SPECIFIC GRAVITY, UA POC OHS: >=1.03
UROBILINOGEN, UA POC OHS: 0.2

## 2024-11-09 PROCEDURE — 81003 URINALYSIS AUTO W/O SCOPE: CPT | Mod: QW,S$GLB,,

## 2024-11-09 PROCEDURE — 87088 URINE BACTERIA CULTURE: CPT

## 2024-11-09 PROCEDURE — 99204 OFFICE O/P NEW MOD 45 MIN: CPT | Mod: S$GLB,,,

## 2024-11-09 PROCEDURE — 87086 URINE CULTURE/COLONY COUNT: CPT

## 2024-11-09 PROCEDURE — 87186 SC STD MICRODIL/AGAR DIL: CPT

## 2024-11-09 PROCEDURE — 87591 N.GONORRHOEAE DNA AMP PROB: CPT

## 2024-11-09 RX ORDER — SULFAMETHOXAZOLE AND TRIMETHOPRIM 800; 160 MG/1; MG/1
1 TABLET ORAL 2 TIMES DAILY
Qty: 28 TABLET | Refills: 0 | Status: SHIPPED | OUTPATIENT
Start: 2024-11-09 | End: 2024-11-23

## 2024-11-09 NOTE — PROGRESS NOTES
"Subjective:      Patient ID: Judson Torres is a 79 y.o. male.    Vitals:  height is 5' 11" (1.803 m) and weight is 99 kg (218 lb 4.1 oz). His oral temperature is 97.9 °F (36.6 °C). His blood pressure is 161/99 (abnormal) and his pulse is 68. His respiration is 18 and oxygen saturation is 98%.     Chief Complaint: Dysuria    Pt present with hematuria, dysuria, frequent urination. Sx started 2 days ago. Denies any fever or chills. No myalgias. States he had some mild L sided back pain 2 days ago which has resolved. Reports he goes to the gym so he possibly could have pulled a muscle. Tx include azo with mild relief. He is sexually active with one female partner, unprotected intercourse. Patient ok with STD testing. No other complaints at this time.        Dysuria   This is a new problem. The current episode started in the past 7 days. The problem occurs every urination. The problem has been unchanged. The quality of the pain is described as burning. The pain is at a severity of 8/10. The pain is moderate. There has been no fever. Associated symptoms include frequency, hematuria and urgency. The treatment provided no relief.     Genitourinary:  Positive for dysuria, frequency, urgency and hematuria.      Objective:     Physical Exam   Constitutional: He is oriented to person, place, and time. He appears well-developed. No distress.   HENT:   Head: Normocephalic and atraumatic.   Ears:   Right Ear: External ear normal.   Left Ear: External ear normal.   Nose: Nose normal. No nasal deformity. No epistaxis.   Mouth/Throat: Oropharynx is clear and moist and mucous membranes are normal.   Eyes: Conjunctivae and lids are normal.   Neck: Trachea normal and phonation normal. Neck supple.   Cardiovascular: Normal rate.   Pulmonary/Chest: Effort normal and breath sounds normal.   Abdominal: Normal appearance and bowel sounds are normal. He exhibits no distension. Soft. There is no abdominal tenderness. There is no rebound, no " guarding, no left CVA tenderness and no right CVA tenderness.   Musculoskeletal: Normal range of motion.         General: Normal range of motion.   Neurological: He is alert and oriented to person, place, and time. He has normal reflexes.   Skin: Skin is warm, dry, intact and not diaphoretic.   Psychiatric: His speech is normal and behavior is normal. Judgment and thought content normal.   Nursing note and vitals reviewed.    Results for orders placed or performed in visit on 11/09/24   POCT Urinalysis(Instrument)    Collection Time: 11/09/24  9:13 AM   Result Value Ref Range    Color, POC UA Jayna (A) Yellow, Straw, Colorless    Clarity, POC UA Slight Cloudy (A) Clear    Glucose, POC UA Negative Negative    Bilirubin, POC UA Negative Negative    Ketones, POC UA Negative Negative    Spec Grav POC UA >=1.030 1.005 - 1.030    Blood, POC UA Large (A) Negative    pH, POC UA 6.0 5.0 - 8.0    Protein, POC  (A) Negative    Urobilinogen, POC UA 0.2 <=1.0    Nitrite, POC UA Negative Negative    WBC, POC UA Moderate (A) Negative         Assessment:     1. Acute cystitis with hematuria    2. Dysuria    3. Elevated blood pressure reading        Plan:       Acute cystitis with hematuria  -     C. trachomatis/N. gonorrhoeae by AMP DNA [facility]; Urine  -     sulfamethoxazole-trimethoprim 800-160mg (BACTRIM DS) 800-160 mg Tab; Take 1 tablet by mouth 2 (two) times daily. for 14 days  Dispense: 28 tablet; Refill: 0        UA with leukocytes and patient symptomatic, treating as complicating UTI given report of possible L flank pain 2 days ago. Ddx discussed with patient. Questions and concerns addressed. Return precautions given. He verbalized understanding and is in agreement with plan.    Dysuria  -     POCT Urinalysis(Instrument)  -     CULTURE, URINE  -     C. trachomatis/N. gonorrhoeae by AMP DNA [facility]; Urine  Chalmydia/gonorrhea testing offered as patient sexually active, unprotected intercourse.    Elevated blood  pressure reading  Recommend close followup with PCP. Asx at this time.     - Discussed ddx, home care, tx options, and given follow up precautions.  I have reviewed the patient's chart to view previous visits, labs, and imaging to assess PMH and look for any trends or previous treatments.

## 2024-11-09 NOTE — PATIENT INSTRUCTIONS
- You have been given an antibiotic to treat your condition today.    - Please complete the antibiotic as directed on the bottle.   - you can take otc probiotic to limit upset stomach  See attached for information on UTI    - You must understand that you have received an Urgent Care treatment only and that you may be released before all of your medical problems are known or treated.   - You, the patient, will arrange for follow up care as instructed.   - If your condition worsens or fails to improve we recommend that you receive another evaluation at the ER immediately or contact your PCP to discuss your concerns or return here.

## 2024-11-10 LAB
C TRACH DNA SPEC QL NAA+PROBE: NOT DETECTED
N GONORRHOEA DNA SPEC QL NAA+PROBE: NOT DETECTED

## 2024-11-11 LAB — BACTERIA UR CULT: ABNORMAL

## 2024-11-13 ENCOUNTER — TELEPHONE (OUTPATIENT)
Dept: URGENT CARE | Facility: CLINIC | Age: 79
End: 2024-11-13
Payer: MEDICARE

## 2024-11-13 NOTE — TELEPHONE ENCOUNTER
Called patient and had no answer. Left VM to discuss positive urine culture results. Patient treated appropriately with Bactrim. We will try to call again tomorrow.

## 2024-11-13 NOTE — TELEPHONE ENCOUNTER
Reviewed results with patient. Spelled out bacteria for pt to write down. Urged patient to sign up for [facility]. Discussed serratia is found in our gut. He states he is feeling better.       Results for orders placed or performed in visit on 11/09/24   CULTURE, URINE    Collection Time: 11/09/24  9:12 AM    Specimen: Urine, Clean Catch   Result Value Ref Range    Urine Culture, Routine SERRATIA MARCESCENS  10,000 - 49,999 cfu/ml   (A)        Susceptibility    Serratia marcescens - CULTURE, URINE     Ceftriaxone >2 Resistant mcg/mL     Ciprofloxacin <=0.25 Sensitive mcg/mL     Cefepime <=2 Sensitive mcg/mL     Ertapenem <=0.5 Sensitive mcg/mL     Gentamicin <=2 Sensitive mcg/mL     Levofloxacin <=0.5 Sensitive mcg/mL     Meropenem <=1 Sensitive mcg/mL     Piperacillin/Tazo 16 Intermediate mcg/mL     Trimeth/Sulfa <=2/38 Sensitive mcg/mL     Tobramycin 4 Intermediate mcg/mL   POCT Urinalysis(Instrument)    Collection Time: 11/09/24  9:13 AM   Result Value Ref Range    Color, POC UA Jayna (A) Yellow, Straw, Colorless    Clarity, POC UA Slight Cloudy (A) Clear    Glucose, POC UA Negative Negative    Bilirubin, POC UA Negative Negative    Ketones, POC UA Negative Negative    Spec Grav POC UA >=1.030 1.005 - 1.030    Blood, POC UA Large (A) Negative    pH, POC UA 6.0 5.0 - 8.0    Protein, POC  (A) Negative    Urobilinogen, POC UA 0.2 <=1.0    Nitrite, POC UA Negative Negative    WBC, POC UA Moderate (A) Negative   C. trachomatis/N. gonorrhoeae by AMP DNA [facility]; Urine    Collection Time: 11/09/24  9:27 AM   Result Value Ref Range    Chlamydia, Amplified DNA Not Detected Not Detected    N gonorrhoeae, amplified DNA Not Detected Not Detected

## 2024-12-17 ENCOUNTER — OFFICE VISIT (OUTPATIENT)
Dept: URGENT CARE | Facility: CLINIC | Age: 79
End: 2024-12-17
Payer: MEDICARE

## 2024-12-17 VITALS
SYSTOLIC BLOOD PRESSURE: 167 MMHG | TEMPERATURE: 98 F | DIASTOLIC BLOOD PRESSURE: 103 MMHG | BODY MASS INDEX: 30.52 KG/M2 | HEART RATE: 95 BPM | HEIGHT: 71 IN | OXYGEN SATURATION: 98 % | WEIGHT: 218 LBS | RESPIRATION RATE: 20 BRPM

## 2024-12-17 DIAGNOSIS — S61.511A LACERATION OF RIGHT WRIST, INITIAL ENCOUNTER: Primary | ICD-10-CM

## 2024-12-17 PROCEDURE — 90715 TDAP VACCINE 7 YRS/> IM: CPT | Mod: S$GLB,,, | Performed by: NURSE PRACTITIONER

## 2024-12-17 PROCEDURE — 90471 IMMUNIZATION ADMIN: CPT | Mod: S$GLB,,, | Performed by: NURSE PRACTITIONER

## 2024-12-17 PROCEDURE — 99499 UNLISTED E&M SERVICE: CPT | Mod: S$GLB,,, | Performed by: NURSE PRACTITIONER

## 2024-12-17 RX ORDER — ALFUZOSIN HYDROCHLORIDE 10 MG/1
10 TABLET, EXTENDED RELEASE ORAL
COMMUNITY
Start: 2024-10-23

## 2024-12-17 RX ORDER — DOXYCYCLINE 100 MG/1
100 CAPSULE ORAL EVERY 12 HOURS
Qty: 10 CAPSULE | Refills: 0 | Status: SHIPPED | OUTPATIENT
Start: 2024-12-17 | End: 2024-12-22

## 2024-12-17 NOTE — PROGRESS NOTES
"  Subjective:      Patient ID: Judson Torres is a 79 y.o. male.    Vitals:  height is 5' 11" (1.803 m) and weight is 98.9 kg (218 lb). His oral temperature is 98 °F (36.7 °C). His blood pressure is 167/103 (abnormal) and his pulse is 95. His respiration is 20 and oxygen saturation is 98%.     Chief Complaint: Laceration    Pt states that he is coming in for a right wrist laceration. Pt injury started 2 hours ago . Pt pain level is a 4. Pt didn't self treat.     Laceration   The incident occurred less than 1 hour ago. Pain location: wrist. The laceration is 1 cm in size. The laceration mechanism was a blunt object. The pain is at a severity of 4/10. The pain is mild. The pain has been Constant since onset. He reports no foreign bodies present. His tetanus status is unknown.       Skin:  Positive for laceration. Negative for erythema.      Objective:     Physical Exam   Constitutional: He is oriented to person, place, and time. He appears well-developed.   HENT:   Head: Normocephalic and atraumatic. Head is without abrasion, without contusion and without laceration.   Ears:   Right Ear: External ear normal.   Left Ear: External ear normal.   Nose: Nose normal.   Mouth/Throat: Oropharynx is clear and moist and mucous membranes are normal.   Eyes: Conjunctivae, EOM and lids are normal. Pupils are equal, round, and reactive to light.   Neck: Trachea normal and phonation normal. Neck supple.   Cardiovascular: Normal rate, regular rhythm and normal heart sounds.   Pulmonary/Chest: Effort normal and breath sounds normal. No stridor. No respiratory distress.   Musculoskeletal: Normal range of motion.         General: Normal range of motion.   Neurological: He is alert and oriented to person, place, and time.   Skin: Skin is warm, dry, intact and no rash. Capillary refill takes less than 2 seconds. No abrasion, No burn, No bruising, No erythema and No ecchymosis        Psychiatric: His speech is normal and behavior is " normal. Judgment and thought content normal.   Nursing note and vitals reviewed.            Assessment:     1. Laceration of right wrist, initial encounter        Plan:       Laceration of right wrist, initial encounter  -     Tdap (BOOSTRIX) vaccine injection 0.5 mL  -     doxycycline (VIBRAMYCIN) 100 MG Cap; Take 1 capsule (100 mg total) by mouth every 12 (twelve) hours. for 5 days  Dispense: 10 capsule; Refill: 0  -     Laceration Repair      Patient requesting steristrips. We made decision together to use dermabond to close the laceration. Bleeding was controlled. Denies pain. Updated Tdap and given prophylactic antibiotics to complete. Covered with nonadherent dressing and wrapped in coflex.    Laceration Repair    Date/Time: 12/17/2024 12:30 PM    Performed by: Priti Webb NP  Authorized by: Priti Webb NP  Irrigation solution: saline  Irrigation method: syringe  Amount of cleaning: standard  Debridement: none  Degree of undermining: none  Skin closure: glue  Dressing: adhesive bandage and non-stick sterile dressing  Patient tolerance: Patient tolerated the procedure well with no immediate complications

## 2024-12-18 NOTE — PROCEDURES
Laceration Repair    Date/Time: 12/17/2024 12:30 PM    Performed by: Priti Webb NP  Authorized by: Priti Webb NP  Irrigation solution: saline  Irrigation method: syringe  Amount of cleaning: standard  Debridement: none  Degree of undermining: none  Skin closure: glue  Dressing: adhesive bandage and non-stick sterile dressing  Patient tolerance: Patient tolerated the procedure well with no immediate complications